# Patient Record
Sex: FEMALE | URBAN - METROPOLITAN AREA
[De-identification: names, ages, dates, MRNs, and addresses within clinical notes are randomized per-mention and may not be internally consistent; named-entity substitution may affect disease eponyms.]

---

## 2019-11-21 ENCOUNTER — APPOINTMENT (RX ONLY)
Dept: URBAN - METROPOLITAN AREA CLINIC 43 | Facility: CLINIC | Age: 64
Setting detail: DERMATOLOGY
End: 2019-11-21

## 2019-11-21 DIAGNOSIS — Z41.9 ENCOUNTER FOR PROCEDURE FOR PURPOSES OTHER THAN REMEDYING HEALTH STATE, UNSPECIFIED: ICD-10-CM

## 2019-11-21 PROCEDURE — ? BOTOX

## 2019-11-21 PROCEDURE — ? OTHER

## 2019-11-21 NOTE — PROCEDURE: BOTOX
Consent: Written consent obtained. Risks include but not limited to lid/brow ptosis, bruising, swelling, diplopia, temporary effect, incomplete chemical denervation.
Lot #: [id number]
Show Additional Area 3: Yes
Additional Area 4 Units: 0
Show Right And Left Pupillary Line Units: No
Detail Level: Detailed
Post-Care Instructions: Patient instructed to not lie down for 4 hours and limit physical activity for 24 hours. Patient instructed not to travel by airplane for 48 hours.
Dilution (U/0.1 Cc): 4
Additional Area 1 Location: glabella forehead
Expiration Date (Month Year): 5/2022
Price (Use Numbers Only, No Special Characters Or $): 118

## 2019-11-21 NOTE — PROCEDURE: OTHER
Other (Free Text): 32 units
Detail Level: Zone
Note Text (......Xxx Chief Complaint.): This diagnosis correlates with the

## 2022-07-13 ENCOUNTER — APPOINTMENT (RX ONLY)
Dept: URBAN - METROPOLITAN AREA CLINIC 42 | Facility: CLINIC | Age: 67
Setting detail: DERMATOLOGY
End: 2022-07-13

## 2022-07-13 DIAGNOSIS — L30.9 DERMATITIS, UNSPECIFIED: ICD-10-CM | Status: INADEQUATELY CONTROLLED

## 2022-07-13 DIAGNOSIS — L82.1 OTHER SEBORRHEIC KERATOSIS: ICD-10-CM

## 2022-07-13 DIAGNOSIS — L85.3 XEROSIS CUTIS: ICD-10-CM

## 2022-07-13 PROCEDURE — 99214 OFFICE O/P EST MOD 30 MIN: CPT

## 2022-07-13 PROCEDURE — ? PRESCRIPTION

## 2022-07-13 PROCEDURE — ? PRESCRIPTION MEDICATION MANAGEMENT

## 2022-07-13 PROCEDURE — ? COUNSELING

## 2022-07-13 RX ORDER — BETAMETHASONE DIPROPIONATE 0.5 MG/ML
LOTION, AUGMENTED TOPICAL
Qty: 60 | Refills: 1 | Status: ERX

## 2022-07-13 ASSESSMENT — LOCATION SIMPLE DESCRIPTION DERM
LOCATION SIMPLE: LEFT FOOT
LOCATION SIMPLE: RIGHT FOOT
LOCATION SIMPLE: ANTERIOR SCALP
LOCATION SIMPLE: RIGHT UPPER BACK
LOCATION SIMPLE: RIGHT POSTERIOR UPPER ARM

## 2022-07-13 ASSESSMENT — LOCATION DETAILED DESCRIPTION DERM
LOCATION DETAILED: RIGHT MEDIAL UPPER BACK
LOCATION DETAILED: LEFT DORSAL FOOT
LOCATION DETAILED: RIGHT PROXIMAL POSTERIOR UPPER ARM
LOCATION DETAILED: RIGHT DORSAL FOOT
LOCATION DETAILED: MID-FRONTAL SCALP

## 2022-07-13 ASSESSMENT — LOCATION ZONE DERM
LOCATION ZONE: TRUNK
LOCATION ZONE: ARM
LOCATION ZONE: FEET
LOCATION ZONE: SCALP

## 2022-07-13 NOTE — PROCEDURE: COUNSELING
Detail Level: Zone
Patient Specific Counseling (Will Not Stick From Patient To Patient): Papules in scalp secondary to scratching but no scaling seen . Cannot rule out itching from a contact dermatitis.

## 2022-09-02 ENCOUNTER — APPOINTMENT (RX ONLY)
Dept: URBAN - METROPOLITAN AREA CLINIC 42 | Facility: CLINIC | Age: 67
Setting detail: DERMATOLOGY
End: 2022-09-02

## 2022-09-02 DIAGNOSIS — Z41.9 ENCOUNTER FOR PROCEDURE FOR PURPOSES OTHER THAN REMEDYING HEALTH STATE, UNSPECIFIED: ICD-10-CM

## 2022-09-02 PROCEDURE — ? DELUXE HYDRAFACIAL

## 2022-09-02 ASSESSMENT — LOCATION SIMPLE DESCRIPTION DERM
LOCATION SIMPLE: INFERIOR FOREHEAD
LOCATION SIMPLE: LEFT CHEEK
LOCATION SIMPLE: CHIN
LOCATION SIMPLE: RIGHT CHEEK
LOCATION SIMPLE: CHIN
LOCATION SIMPLE: RIGHT ANTERIOR NECK
LOCATION SIMPLE: INFERIOR FOREHEAD
LOCATION SIMPLE: LEFT ANTERIOR NECK
LOCATION SIMPLE: SUBMENTAL CHIN
LOCATION SIMPLE: RIGHT CHEEK
LOCATION SIMPLE: LEFT CHEEK

## 2022-09-02 ASSESSMENT — LOCATION DETAILED DESCRIPTION DERM
LOCATION DETAILED: LEFT CENTRAL MALAR CHEEK
LOCATION DETAILED: LEFT CHIN
LOCATION DETAILED: RIGHT INFERIOR CENTRAL MALAR CHEEK
LOCATION DETAILED: SUBMENTAL CHIN
LOCATION DETAILED: INFERIOR MID FOREHEAD
LOCATION DETAILED: RIGHT SUPERIOR LATERAL NECK
LOCATION DETAILED: LEFT INFERIOR CENTRAL MALAR CHEEK
LOCATION DETAILED: RIGHT INFERIOR CENTRAL MALAR CHEEK
LOCATION DETAILED: LEFT CHIN
LOCATION DETAILED: LEFT SUPERIOR LATERAL NECK
LOCATION DETAILED: INFERIOR MID FOREHEAD

## 2022-09-02 ASSESSMENT — LOCATION ZONE DERM
LOCATION ZONE: FACE
LOCATION ZONE: NECK
LOCATION ZONE: FACE

## 2022-09-02 NOTE — HPI: COSMETIC (HYDRAFACIAL)
Additional History: Pt discuses general interest in bettering skin health. Routine, peels, microneedling, facials, etc.

## 2022-09-02 NOTE — PROCEDURE: DELUXE HYDRAFACIAL
Procedure: Boost
Location: face
Number Of Passes: 0
Post-Care Instructions: I reviewed with the patient in detail post-care instructions. Patient should stay away from the sun and wear sun protection until treated areas are fully healed.
Solution Override
Tip: Hydropeel Tip, Clear
Solution: Activ-4
Number Of Passes: 2
Solution: Beta-HD
Tip: Hydropeel Tip, Teal
Procedure: Fusion
Price (Use Numbers Only, No Special Characters Or $): 683
Treatment Number: 1
Tip: Hydropeel Tip, Orange Aggression
Procedure: Peel
Tip Override
Procedure: Extraction
Consent: Written consent obtained, risks reviewed including but not limited to crusting, scabbing, blistering, scarring, darker or lighter pigmentary change, bruising, and/or incomplete response.
Indication: anti-aging
Procedure: Extend and Protect
Solution: GlySal 15%
Procedure: Exfoliation

## 2022-09-02 NOTE — PROCEDURE: MIPS QUALITY
Quality 111:Pneumonia Vaccination Status For Older Adults: Pneumococcal vaccine (PPSV23) administered on or after patient’s 60th birthday and before the end of the measurement period
Quality 110: Preventive Care And Screening: Influenza Immunization: Influenza Immunization Administered during Influenza season
Detail Level: Detailed

## 2022-09-14 ENCOUNTER — APPOINTMENT (RX ONLY)
Dept: URBAN - METROPOLITAN AREA CLINIC 42 | Facility: CLINIC | Age: 67
Setting detail: DERMATOLOGY
End: 2022-09-14

## 2022-09-14 DIAGNOSIS — Z41.9 ENCOUNTER FOR PROCEDURE FOR PURPOSES OTHER THAN REMEDYING HEALTH STATE, UNSPECIFIED: ICD-10-CM

## 2022-09-14 PROCEDURE — ? MICRONEEDLING

## 2022-09-14 ASSESSMENT — LOCATION DETAILED DESCRIPTION DERM
LOCATION DETAILED: RIGHT INFERIOR CENTRAL MALAR CHEEK
LOCATION DETAILED: LEFT CHIN
LOCATION DETAILED: RIGHT CHIN
LOCATION DETAILED: LEFT MEDIAL FOREHEAD
LOCATION DETAILED: LEFT MEDIAL FOREHEAD
LOCATION DETAILED: NASAL DORSUM
LOCATION DETAILED: RIGHT SUPERIOR MEDIAL BUCCAL CHEEK
LOCATION DETAILED: LEFT INFERIOR CENTRAL MALAR CHEEK
LOCATION DETAILED: LEFT INFERIOR CENTRAL MALAR CHEEK
LOCATION DETAILED: NASAL DORSUM

## 2022-09-14 ASSESSMENT — LOCATION SIMPLE DESCRIPTION DERM
LOCATION SIMPLE: LEFT FOREHEAD
LOCATION SIMPLE: LEFT CHEEK
LOCATION SIMPLE: CHIN
LOCATION SIMPLE: RIGHT CHEEK
LOCATION SIMPLE: NOSE
LOCATION SIMPLE: RIGHT CHEEK
LOCATION SIMPLE: LEFT CHEEK
LOCATION SIMPLE: LEFT FOREHEAD
LOCATION SIMPLE: CHIN
LOCATION SIMPLE: NOSE

## 2022-09-14 ASSESSMENT — LOCATION ZONE DERM
LOCATION ZONE: NOSE
LOCATION ZONE: FACE
LOCATION ZONE: NOSE
LOCATION ZONE: FACE

## 2022-09-14 NOTE — PROCEDURE: MICRONEEDLING
Location #3: temple
Length Of Topical Anesthesia Application (Optional): 20 minutes
Detail Level: Detailed
Treatment Number (Optional): 1
Location #5: chin
Depth In Mm: 0.25
Post-Care Instructions: After the procedure, take precautions agains sun exposure. Do not apply sunscreen for 12 hours after the procedure. Do not apply make-up for 12 hours after the procedure. Avoid alcohol based toners for 10-14 days. After 2-3 days patients can return to their regular skin regimen.
Price (Use Numbers Only, No Special Characters Or $): 500
Location #2: checks
Infusions (Optional): hyaluronic acid
Consent: Written consent obtained, risks reviewed including but not limited to pain, scarring, infection and incomplete improvement.  Patient understands the procedure is cosmetic in nature and will require out of pocket payment.
Location #4: nose
Location #1: forehead
Topical Anesthesia?: BLT cream (benzocaine 20%, lidocaine 6%, tetracaine 4%)

## 2022-10-12 ENCOUNTER — APPOINTMENT (RX ONLY)
Dept: URBAN - METROPOLITAN AREA CLINIC 42 | Facility: CLINIC | Age: 67
Setting detail: DERMATOLOGY
End: 2022-10-12

## 2022-10-12 DIAGNOSIS — Z41.9 ENCOUNTER FOR PROCEDURE FOR PURPOSES OTHER THAN REMEDYING HEALTH STATE, UNSPECIFIED: ICD-10-CM

## 2022-10-12 PROCEDURE — ? MICRONEEDLING

## 2022-10-12 ASSESSMENT — LOCATION SIMPLE DESCRIPTION DERM
LOCATION SIMPLE: RIGHT CHEEK
LOCATION SIMPLE: LEFT CHEEK
LOCATION SIMPLE: LEFT CHEEK
LOCATION SIMPLE: RIGHT CHEEK
LOCATION SIMPLE: CHIN
LOCATION SIMPLE: RIGHT FOREHEAD
LOCATION SIMPLE: SUPERIOR FOREHEAD
LOCATION SIMPLE: RIGHT LIP

## 2022-10-12 ASSESSMENT — LOCATION ZONE DERM
LOCATION ZONE: FACE
LOCATION ZONE: LIP
LOCATION ZONE: FACE

## 2022-10-12 ASSESSMENT — LOCATION DETAILED DESCRIPTION DERM
LOCATION DETAILED: SUPERIOR MID FOREHEAD
LOCATION DETAILED: LEFT INFERIOR CENTRAL MALAR CHEEK
LOCATION DETAILED: LEFT INFERIOR CENTRAL MALAR CHEEK
LOCATION DETAILED: RIGHT MEDIAL FOREHEAD
LOCATION DETAILED: RIGHT LOWER CUTANEOUS LIP
LOCATION DETAILED: RIGHT MENTAL CREASE
LOCATION DETAILED: RIGHT CENTRAL MALAR CHEEK
LOCATION DETAILED: RIGHT CENTRAL MALAR CHEEK

## 2022-10-12 NOTE — PROCEDURE: MICRONEEDLING
Infusions (Optional): hyaluronic acid
Depth In Mm: 0.25
Location #4: lip
Price (Use Numbers Only, No Special Characters Or $): 500
Consent: Written consent obtained, risks reviewed including but not limited to pain, scarring, infection and incomplete improvement.  Patient understands the procedure is cosmetic in nature and will require out of pocket payment.
Location #2: checks
Location #3: chin
Detail Level: Zone
Location #5: temples
Treatment Number (Optional): 2
Length Of Topical Anesthesia Application (Optional): 20 minutes
Location #1: forehead
Topical Anesthesia?: BLT cream (benzocaine 20%, lidocaine 6%, tetracaine 4%)
Post-Care Instructions: After the procedure, take precautions agains sun exposure. Do not apply sunscreen for 12 hours after the procedure. Do not apply make-up for 12 hours after the procedure. Avoid alcohol based toners for 10-14 days. After 2-3 days patients can return to their regular skin regimen.
Depth In Mm: 0.5

## 2022-11-10 ENCOUNTER — APPOINTMENT (RX ONLY)
Dept: URBAN - METROPOLITAN AREA CLINIC 42 | Facility: CLINIC | Age: 67
Setting detail: DERMATOLOGY
End: 2022-11-10

## 2022-11-10 DIAGNOSIS — Z41.9 ENCOUNTER FOR PROCEDURE FOR PURPOSES OTHER THAN REMEDYING HEALTH STATE, UNSPECIFIED: ICD-10-CM

## 2022-11-10 PROCEDURE — ? MICRONEEDLING

## 2022-11-10 ASSESSMENT — LOCATION SIMPLE DESCRIPTION DERM: LOCATION SIMPLE: LEFT CHEEK

## 2022-11-10 ASSESSMENT — LOCATION DETAILED DESCRIPTION DERM: LOCATION DETAILED: LEFT INFERIOR MEDIAL MALAR CHEEK

## 2022-11-10 ASSESSMENT — LOCATION ZONE DERM: LOCATION ZONE: FACE

## 2022-11-10 NOTE — PROCEDURE: MICRONEEDLING
Depth In Mm: 0.1
Consent: Written consent obtained, risks reviewed including but not limited to pain, scarring, infection and incomplete improvement.  Patient understands the procedure is cosmetic in nature and will require out of pocket payment.
Depth In Mm: 2.5
Treatment Number (Optional): 0
Detail Level: Zone
Price (Use Numbers Only, No Special Characters Or $): 500
Infusions (Optional): hyaluronic acid
Post-Care Instructions: After the procedure, take precautions agains sun exposure. Do not apply sunscreen for 12 hours after the procedure. Do not apply make-up for 12 hours after the procedure. Avoid alcohol based toners for 10-14 days. After 2-3 days patients can return to their regular skin regimen.
Depth In Mm: 1.5
Topical Anesthesia?: BLT cream (benzocaine 20%, lidocaine 6%, tetracaine 4%)
Location #1: full face

## 2022-11-18 ENCOUNTER — APPOINTMENT (RX ONLY)
Dept: URBAN - METROPOLITAN AREA CLINIC 42 | Facility: CLINIC | Age: 67
Setting detail: DERMATOLOGY
End: 2022-11-18

## 2022-11-18 DIAGNOSIS — Z41.9 ENCOUNTER FOR PROCEDURE FOR PURPOSES OTHER THAN REMEDYING HEALTH STATE, UNSPECIFIED: ICD-10-CM

## 2022-11-18 PROCEDURE — ? BOTOX

## 2022-11-18 PROCEDURE — ? OTHER

## 2022-11-18 NOTE — PROCEDURE: BOTOX
Consent: Written consent obtained. Risks include but not limited to lid/brow ptosis, bruising, swelling, diplopia, temporary effect, incomplete chemical denervation.
Lot #: [id number]
Show Additional Area 3: Yes
Additional Area 4 Units: 0
Show Right And Left Pupillary Line Units: No
Detail Level: Detailed
Post-Care Instructions: Patient instructed to not lie down for 4 hours and limit physical activity for 24 hours. Patient instructed not to travel by airplane for 48 hours.
Dilution (U/0.1 Cc): 4
Additional Area 1 Location: glabella forehead
Expiration Date (Month Year): 08/2024
Price (Use Numbers Only, No Special Characters Or $): 881

## 2022-11-18 NOTE — PROCEDURE: OTHER
Other (Free Text): 40 units
Detail Level: Zone
Note Text (......Xxx Chief Complaint.): This diagnosis correlates with the

## 2024-03-06 ENCOUNTER — APPOINTMENT (RX ONLY)
Dept: URBAN - METROPOLITAN AREA CLINIC 42 | Facility: CLINIC | Age: 69
Setting detail: DERMATOLOGY
End: 2024-03-06

## 2024-03-06 ENCOUNTER — RX ONLY (OUTPATIENT)
Age: 69
Setting detail: RX ONLY
End: 2024-03-06

## 2024-03-06 DIAGNOSIS — Z41.9 ENCOUNTER FOR PROCEDURE FOR PURPOSES OTHER THAN REMEDYING HEALTH STATE, UNSPECIFIED: ICD-10-CM

## 2024-03-06 PROCEDURE — ? COSMETIC CONSULTATION: AGING FACE

## 2024-03-06 RX ORDER — TRETIONIN 0.25 MG/G
PEA-SIZED AMOUNT CREAM TOPICAL ONCE A DAY
Qty: 45 | Refills: 0 | Status: ERX | COMMUNITY
Start: 2024-03-06

## 2024-11-11 ENCOUNTER — APPOINTMENT (RX ONLY)
Dept: URBAN - METROPOLITAN AREA CLINIC 42 | Facility: CLINIC | Age: 69
Setting detail: DERMATOLOGY
End: 2024-11-11

## 2024-11-11 DIAGNOSIS — D49.2 NEOPLASM OF UNSPECIFIED BEHAVIOR OF BONE, SOFT TISSUE, AND SKIN: ICD-10-CM

## 2024-11-11 PROCEDURE — ? BIOPSY BY SHAVE METHOD

## 2024-11-11 PROCEDURE — ? COUNSELING

## 2024-11-11 PROCEDURE — 11102 TANGNTL BX SKIN SINGLE LES: CPT

## 2024-11-11 ASSESSMENT — LOCATION SIMPLE DESCRIPTION DERM: LOCATION SIMPLE: RIGHT UPPER ARM

## 2024-11-11 ASSESSMENT — LOCATION DETAILED DESCRIPTION DERM: LOCATION DETAILED: RIGHT ANTERIOR PROXIMAL UPPER ARM

## 2024-11-11 ASSESSMENT — LOCATION ZONE DERM: LOCATION ZONE: ARM

## 2024-11-11 NOTE — PROCEDURE: BIOPSY BY SHAVE METHOD
Detail Level: Detailed
Depth Of Biopsy: dermis
Was A Bandage Applied: Yes
Size Of Lesion In Cm: 0
Biopsy Type: H and E
Biopsy Method: Personna blade
Anesthesia Type: 1% Xylocaine with epinephrine
Anesthesia Volume In Cc: 0.5
Hemostasis: Drysol
Wound Care: Petrolatum
Dressing: bandage
Destruction After The Procedure: No
Type Of Destruction Used: Curettage
Curettage Text: The wound bed was treated with curettage after the biopsy was performed.
Cryotherapy Text: The wound bed was treated with cryotherapy after the biopsy was performed.
Electrodesiccation Text: The wound bed was treated with electrodesiccation after the biopsy was performed.
Electrodesiccation And Curettage Text: The wound bed was treated with electrodesiccation and curettage after the biopsy was performed.
Silver Nitrate Text: The wound bed was treated with silver nitrate after the biopsy was performed.
Lab: -[id number]
Consent: Written consent was obtained and risks were reviewed including but not limited to scarring, infection, bleeding, scabbing, incomplete removal, nerve damage and allergy to anesthesia.
Post-Care Instructions: I reviewed with the patient in detail post-care instructions. Patient is to keep the biopsy site dry overnight, and then apply bacitracin twice daily until healed. Patient may apply hydrogen peroxide soaks to remove any crusting.
Notification Instructions: Patient will be notified of biopsy results. However, patient instructed to call the office if not contacted within 2 weeks.
Billing Type: Third-Party Bill
Information: Selecting Yes will display possible errors in your note based on the variables you have selected. This validation is only offered as a suggestion for you. PLEASE NOTE THAT THE VALIDATION TEXT WILL BE REMOVED WHEN YOU FINALIZE YOUR NOTE. IF YOU WANT TO FAX A PRELIMINARY NOTE YOU WILL NEED TO TOGGLE THIS TO 'NO' IF YOU DO NOT WANT IT IN YOUR FAXED NOTE.

## 2024-11-18 ENCOUNTER — APPOINTMENT (RX ONLY)
Dept: URBAN - METROPOLITAN AREA CLINIC 42 | Facility: CLINIC | Age: 69
Setting detail: DERMATOLOGY
End: 2024-11-18

## 2024-11-18 DIAGNOSIS — Z41.9 ENCOUNTER FOR PROCEDURE FOR PURPOSES OTHER THAN REMEDYING HEALTH STATE, UNSPECIFIED: ICD-10-CM

## 2024-11-18 PROCEDURE — ? COSMETIC CONSULTATION: AGING FACE

## 2024-12-16 ENCOUNTER — APPOINTMENT (OUTPATIENT)
Dept: URBAN - METROPOLITAN AREA CLINIC 42 | Facility: CLINIC | Age: 69
Setting detail: DERMATOLOGY
End: 2024-12-16

## 2024-12-16 DIAGNOSIS — Z41.9 ENCOUNTER FOR PROCEDURE FOR PURPOSES OTHER THAN REMEDYING HEALTH STATE, UNSPECIFIED: ICD-10-CM

## 2024-12-16 PROCEDURE — ? SCULPTRA

## 2024-12-16 NOTE — PROCEDURE: SCULPTRA
Right Forehead Filler Volume In Cc: 0
Show Zygomatic Arches Volume?: Yes
Dilution Method: The Sculptra was diluted with 8 ml of sterile water and 1 ml lidocaine 2% for a total volume of 9ccs for each vial (2 vials total).
Show Right And Left Lateral Face Volume?: No
Injection Technique: Patient washed face prior to injection. NP cleansed skin with alcohol and puracyn. Injection sites numbed with lidocaine w/epi. \\n\\nPMHx, allergies, medications reviewed. \\nPhotos and consents obtained.\\nFacial mapping performed. \\n\\nThe Sculptra was injected to the listed areas after cleansing the skin and providing appropriate anesthesia. \\nMedial Cheeks - 2 ml right / 2 ml left\\nLateral Cheeks - 1 ml right / 1 ml left\\nTemples - 1 ml right / 1 ml left\\nPre-auricular- 2 ml right / 2 ml left\\nPre-jowl sulcus/marionette lines - 2 ml right /2 ml left \\n\\nTolerated well. 5 minute massage performed post-treatment to all injected areas with arnica gel. Mild bruising observed on right pre-jowl sulcus. Capillary refill <2 seconds. No evidence of vascular occlusion. Aftercare instructions reviewed with patient, including importance of massage and when to follow up in the office with NP. Patient verbalized understanding instructions. Follow up in the office in 6-8 weeks to evaluate result. Plan to proceed with second Sculptra treatment at that time.
Anesthesia Volume In Cc: 0.6
Consent: Written consent obtained. Risks include but not limited to bruising, beading, irregular texture, ulceration, infection, allergic reaction, scar formation, incomplete augmentation, temporary nature, procedural pain.
Vials Reconstituted (Required For Inventory): 2
Lot #: [id number]
Post-Care Instructions: Patient instructed to apply ice to reduce swelling. Patient instructed to massage injected areas 5 times per day for 5 minutes each time for 5 days. Patient provided paper copy of post-care instructions to take home. Patient verbalized understanding instructions. Follow up in the office in 6.
Detail Level: Detailed
Price (Use Numbers Only, No Special Characters Or $): 1200
Anesthesia Type: 1% lidocaine with epinephrine
Expiration Date (Month Year): 10/31/2026
Map Statement: See Attached Map for Complete Details.

## 2025-01-30 ENCOUNTER — NEW PATIENT (OUTPATIENT)
Dept: URBAN - METROPOLITAN AREA CLINIC 67 | Facility: CLINIC | Age: 70
End: 2025-01-30

## 2025-01-30 DIAGNOSIS — H46.8: ICD-10-CM

## 2025-01-30 DIAGNOSIS — H43.813: ICD-10-CM

## 2025-01-30 DIAGNOSIS — H35.3211: ICD-10-CM

## 2025-01-30 DIAGNOSIS — H35.3122: ICD-10-CM

## 2025-01-30 PROCEDURE — 92201 OPSCPY EXTND RTA DRAW UNI/BI: CPT

## 2025-01-30 PROCEDURE — 92134 CPTRZ OPH DX IMG PST SGM RTA: CPT

## 2025-01-30 PROCEDURE — 99204 OFFICE O/P NEW MOD 45 MIN: CPT

## 2025-01-30 ASSESSMENT — VISUAL ACUITY
OS_PH: 20/25-2
OD_SC: 20/100-2
OD_PH: 20/60
OS_SC: 20/100

## 2025-01-30 ASSESSMENT — TONOMETRY
OS_IOP_MMHG: 18
OD_IOP_MMHG: 16

## 2025-02-17 ENCOUNTER — APPOINTMENT (OUTPATIENT)
Dept: URBAN - METROPOLITAN AREA CLINIC 42 | Facility: CLINIC | Age: 70
Setting detail: DERMATOLOGY
End: 2025-02-17

## 2025-03-05 ENCOUNTER — FOLLOW UP (OUTPATIENT)
Dept: URBAN - METROPOLITAN AREA CLINIC 67 | Facility: CLINIC | Age: 70
End: 2025-03-05

## 2025-03-05 DIAGNOSIS — H25.13: ICD-10-CM

## 2025-03-05 DIAGNOSIS — H35.373: ICD-10-CM

## 2025-03-05 DIAGNOSIS — H46.8: ICD-10-CM

## 2025-03-05 DIAGNOSIS — H43.813: ICD-10-CM

## 2025-03-05 DIAGNOSIS — H43.822: ICD-10-CM

## 2025-03-05 DIAGNOSIS — H35.3211: ICD-10-CM

## 2025-03-05 DIAGNOSIS — H35.3122: ICD-10-CM

## 2025-03-05 PROCEDURE — 92014 COMPRE OPH EXAM EST PT 1/>: CPT

## 2025-03-05 PROCEDURE — 92202 OPSCPY EXTND ON/MAC DRAW: CPT

## 2025-03-05 PROCEDURE — 92134 CPTRZ OPH DX IMG PST SGM RTA: CPT

## 2025-03-05 ASSESSMENT — VISUAL ACUITY
OS_PH: 20/30-2
OS_SC: 20/40-1
OD_PH: 20/25-2
OD_SC: 20/60-1

## 2025-03-05 ASSESSMENT — TONOMETRY
OS_IOP_MMHG: 22
OD_IOP_MMHG: 20

## 2025-03-10 ENCOUNTER — APPOINTMENT (OUTPATIENT)
Dept: URBAN - METROPOLITAN AREA CLINIC 42 | Facility: CLINIC | Age: 70
Setting detail: DERMATOLOGY
End: 2025-03-10

## 2025-03-10 DIAGNOSIS — Z41.9 ENCOUNTER FOR PROCEDURE FOR PURPOSES OTHER THAN REMEDYING HEALTH STATE, UNSPECIFIED: ICD-10-CM

## 2025-03-10 PROCEDURE — ? SCULPTRA

## 2025-03-10 NOTE — PROCEDURE: SCULPTRA
Right Cheek Filler Volume In Cc: 0
Show Local Anesthesia?: Yes
Anesthesia Volume In Cc: 1.2
Vials Reconstituted (Required For Inventory): 2
Show Right And Left Cheek Volume?: No
Dilution Method: Each vial of Sculptra (2 vials) was diluted with 8 ml of sterile water and 1 ml lidocaine 1% for a total volume of 17 ccs.
Additional Anesthesia Type: 1% lidocaine without epinephrine
Lot #: [id number]
Injection Technique: Presents for second Sculptra treatment.\\n\\nPMHx, allergies, medications reviewed. \\nPhotos and consents obtained. \\nFacial mapping performed. NP cleansed skin with alcohol and puracyn. Injection sites numbed with topical lidocaine/tetracaine x10 minutes then lidocaine w/epi. \\n\\nThe Sculptra was injected to the listed areas after cleansing the skin and providing appropriate anesthesia:\\n\\nLateral & Medial Cheeks - 3 ml right / 3 ml left\\nPyriform - 1. 25 ml right / 1.25 ml left\\nTemples - 1.25 ml right /1.25 ml left \\nPre-jowl sulcus/perioral area - 2 ml right /2 ml left \\nNasolabial folds - 1 ml right/1 ml left \\n\\nTolerated well. 5 minute massage performed post-treatment to all injected areas with arnica gel Mild bruising observed on left cheek and in left pre-jowl sulcus area. Capillary refill <2 seconds. No evidence of vascular occlusion. Aftercare instructions reviewed with patient, including importance of massage and when to follow up in the office with NP. Patient verbalized understanding instructions. Follow up in the office in 8 weeks. Will assess plan for third round of Sculptra and/or filler at that visit.
Consent: Written consent obtained. Risks include but not limited to bruising, beading, irregular texture, ulceration, infection, allergic reaction, scar formation, incomplete augmentation, temporary nature, procedural pain.
Expiration Date (Month Year): 04/30/2027
Map Statement: See Attached Map for Complete Details.
Post-Care Instructions: Patient instructed to massage injected areas 5 times per day for 5 minutes each time for 5 days. Patient verbalized understanding instructions. Follow up in the office in 8 weeks.
Topical Anesthesia?: 23% lidocaine, 7% tetracaine
Anesthesia Type: 1% lidocaine with epinephrine
Detail Level: Detailed
Price (Use Numbers Only, No Special Characters Or $): 1600

## 2025-05-14 ENCOUNTER — APPOINTMENT (OUTPATIENT)
Dept: URBAN - METROPOLITAN AREA CLINIC 42 | Facility: CLINIC | Age: 70
Setting detail: DERMATOLOGY
End: 2025-05-14

## 2025-05-14 DIAGNOSIS — Z41.9 ENCOUNTER FOR PROCEDURE FOR PURPOSES OTHER THAN REMEDYING HEALTH STATE, UNSPECIFIED: ICD-10-CM

## 2025-05-14 PROCEDURE — ? BOTOX

## 2025-05-14 PROCEDURE — ? COSMETIC FOLLOW-UP

## 2025-05-14 NOTE — PROCEDURE: BOTOX
Inferior Lateral Orbicularis Oculi Units: 0
Show Depressor Anguli Units: Yes
Lot #: [id number]
Incrementing Botox Units: By 0.5 Units
Show Mentalis Units: No
Anterior Platysmal Bands Units: 16
Dilution (U/0.1 Cc): 10
Consent: Written consent obtained. Risks include but not limited to lid/brow ptosis, bruising, swelling, diplopia, temporary effect, incomplete chemical denervation.\\n\\nPresents for Botox in her face and neck. She is not naive to Botox, though it has been several years since her last treatment. Reports that she experiences eyebrow ptosis every time her frontalis is treated, so she does not want to treat her frontalis today. \\n\\nPMHx, allergies, medications reviewed.\\nPhotos and consents obtained.\\nSkin cleansed with alcohol and marked.\\n\\nTolerated well. Arnica gel applied. Aftercare instructions reviewed. Follow up in the office in 2 weeks.
Post-Care Instructions: Patient instructed to not lie down for 4 hours and limit physical activity for 24 hours.
Glabellar Complex Units: 22
Additional Area 1 Location: [facility]
Depressor Anguli Oris Units: 4
Periorbital Skin Units: 38
Detail Level: Detailed
Expiration Date (Month Year): 11/2026

## 2025-05-14 NOTE — PROCEDURE: COSMETIC FOLLOW-UP
Comments (Free Text): Presents for Sculptra follow up. Patient reports that she does not notice any improvement in her skin quality. Photos obtained. Reviewed before/after photos with patient. Minimal improvement observed. Discussed that for best result, patient should complete a third session. Patient reports that she feels like she looks old and wants a more noticeable improvement. Recommended Botox today and  her mid-face and lower face in 2 weeks at filler follow up. Patient quoted $750/syringe. Recommended 2-3 syringes to start. Patient in agreement with plan. Filler pre-treatment instructions reviewed.
Price (Use Numbers Only, No Special Characters Or $): 0
Detail Level: Zone

## 2025-06-09 ENCOUNTER — APPOINTMENT (OUTPATIENT)
Dept: URBAN - METROPOLITAN AREA CLINIC 35 | Facility: CLINIC | Age: 70
Setting detail: DERMATOLOGY
End: 2025-06-09

## 2025-06-09 DIAGNOSIS — Z41.9 ENCOUNTER FOR PROCEDURE FOR PURPOSES OTHER THAN REMEDYING HEALTH STATE, UNSPECIFIED: ICD-10-CM

## 2025-06-09 PROCEDURE — ? FILLERS

## 2025-06-09 PROCEDURE — ? COSMETIC CONSULTATION: GENERAL

## 2025-06-09 PROCEDURE — ? COSMETIC FOLLOW-UP

## 2025-06-09 NOTE — PROCEDURE: COSMETIC FOLLOW-UP
Price (Use Numbers Only, No Special Characters Or $): 0
Comments (Free Text): Patient presents for full face and neck Botox follow up. She reports being pleased with her result. Photos obtained. Reviewed before/after photos with patient. No additional medication needed today. Follow up in the office in 3 months or PRN for next Botox treatment.
Detail Level: Zone

## 2025-06-09 NOTE — PROCEDURE: FILLERS
Mid Face Filler Volume In Cc: 0
Lot #: [id number]
Additional Area 1 Volume In Cc: 0.8
Include Cannula Size?: 25G
Aspiration Statement: Aspiration was performed prior to injecting site with filler.
Anesthesia Volume In Cc: 0.4
Additional Area 2 Location: Left Pyriform
Additional Area 4 Location: Left deep medial fat compartment
Expiration Date (Month Year): 01/31/2027
Additional Area 2 Volume In Cc: 0.2
Filler Comments: Patient presents for facial balancing with filler. Her primary concern is the appearance of her mid-and lower face. Discussed giving medial cheek, pyriform, chin, and pre-jowl sulcus support today with filler. Patient in agreement with plan.\\n\\nPMHx, allergies, medications reviewed.\\nPhotos and consents obtained.\\nSkin cleansed with hibiclens. Facial mapping performed. \\n\\nTolerated well. Arnica gel applied. Small bruise observed on right medial cheek. Capillary refill <2 seconds. No signs of vascular occlusion. Aftercare instructions reviewed, including when to follow up in the office immediately with NP. Patient verbalized understanding aftercare instructions. Patient given ice pack. Follow up in the office PRN.
Lot #: [id number]
Filler: Anant Fox
Include Cannula Information In Note?: No
Include Cannula Length?: 1.5 inch
Consent: Written consent obtained. Risks include but not limited to bruising, beading, irregular texture, ulceration, infection, allergic reaction, scar formation, incomplete augmentation, temporary nature, and procedural pain.
Expiration Date (Month Year): 08/31/2026
Post-Care Instructions: After the procedure, patient instructed to apply ice to reduce swelling PRN. No dental visits for 4 weeks. No exercise for 3 days. Do not apply makeup for 24 hours. Change pillowcase tonight. Use clean towels during healing process.
Lot #: [id number]
Topical Anesthesia?: 7% tetracaine, 23% lidocaine
Additional Area 1 Location: Pre-jowl sulcus (left)
Include Cannula Information In Note?: Yes
Additional Area 1 Location: Right Cheek
Additional Area 1 Location: Chin shadows/marionette lines
Expiration Date (Month Year): 07/02/2024
Lot #: [id number]
Additional Area 2 Location: Right Pyriform
Filler Comments: Skin cleansed with hibiclens. Facial mapping performed. \\n\\nTolerated well. Arnica gel applied. Bruise observed on left medial cheek. Swelling observed in left medial cheek. Capillary refill <2 seconds. No signs of vascular occlusion. Aftercare instructions reviewed, including when to follow up in the office immediately with NP. Patient verbalized understanding aftercare instructions. Patient given ice pack. Follow up in the office PRN.
Additional Area 1 Volume In Cc: 1
Additional Area 2 Location: Right medial cheek
Detail Level: Detailed
Expiration Date (Month Year): 02/28/2026
Filler: Restylane Defyne
Lot #: [id number]
Filler Comments: Skin cleansed with hibiclens. Facial mapping performed. \\n\\n1 ml split between chin and pre-jowl sulcus. Straight needle and cannula used for placement.\\n\\nTolerated well. Arnica gel applied. Small bruises observed at cannula sites along right and left pre-jowl sulcus. Capillary refill <2 seconds. No signs of vascular occlusion. Aftercare instructions reviewed, including when to follow up in the office immediately with NP. Patient verbalized understanding aftercare instructions. Patient given ice pack. Follow up in the office PRN.
Filler: Restylane Refyne
Expiration Date (Month Year): 12/31/2026
Price (Use Numbers Only, No Special Characters Or $): 3078
Lot #: [id number]
Map Statment: See Attach Map for Complete Details
Expiration Date (Month Year): 03/31/2026
Anesthesia Type: 1% lidocaine with epinephrine
Additional Area 3 Location: Right deep medial fat compartment
Filler Comments: 1 ml spread between chin shadows and right and left marionette lines. Lido with epi used to numb cannula sites. \\n\\nTolerated well. Arnica gel applied. No bruising observed. Capillary refill <2 seconds. No signs of vascular occlusion. Aftercare instructions reviewed, including when to follow up in the office immediately with NP. Patient verbalized understanding aftercare instructions. Patient given ice pack. Follow up in the office PRN.
Additional Area 1 Location: Left Cheek

## 2025-07-24 ENCOUNTER — FOLLOW UP (OUTPATIENT)
Dept: URBAN - METROPOLITAN AREA CLINIC 67 | Facility: CLINIC | Age: 70
End: 2025-07-24

## 2025-07-24 DIAGNOSIS — H35.3122: ICD-10-CM

## 2025-07-24 DIAGNOSIS — H35.3211: ICD-10-CM

## 2025-07-24 DIAGNOSIS — H35.373: ICD-10-CM

## 2025-07-24 DIAGNOSIS — H43.813: ICD-10-CM

## 2025-07-24 DIAGNOSIS — H43.822: ICD-10-CM

## 2025-07-24 DIAGNOSIS — H46.8: ICD-10-CM

## 2025-07-24 DIAGNOSIS — H25.13: ICD-10-CM

## 2025-07-24 PROCEDURE — 92014 COMPRE OPH EXAM EST PT 1/>: CPT

## 2025-07-24 PROCEDURE — 92202 OPSCPY EXTND ON/MAC DRAW: CPT

## 2025-07-24 PROCEDURE — 92134 CPTRZ OPH DX IMG PST SGM RTA: CPT

## 2025-07-24 ASSESSMENT — TONOMETRY
OD_IOP_MMHG: 19
OS_IOP_MMHG: 18

## 2025-07-24 ASSESSMENT — VISUAL ACUITY
OD_SC: 20/60-1
OS_SC: 20/50-2